# Patient Record
Sex: FEMALE | Race: ASIAN | NOT HISPANIC OR LATINO
[De-identification: names, ages, dates, MRNs, and addresses within clinical notes are randomized per-mention and may not be internally consistent; named-entity substitution may affect disease eponyms.]

---

## 2020-02-25 PROBLEM — Z00.00 ENCOUNTER FOR PREVENTIVE HEALTH EXAMINATION: Status: ACTIVE | Noted: 2020-02-25

## 2020-02-28 ENCOUNTER — APPOINTMENT (OUTPATIENT)
Dept: INTERNAL MEDICINE | Facility: CLINIC | Age: 30
End: 2020-02-28
Payer: COMMERCIAL

## 2020-02-28 VITALS
OXYGEN SATURATION: 100 % | BODY MASS INDEX: 28.18 KG/M2 | WEIGHT: 173.26 LBS | HEIGHT: 65.75 IN | HEART RATE: 94 BPM | TEMPERATURE: 98.1 F | DIASTOLIC BLOOD PRESSURE: 76 MMHG | SYSTOLIC BLOOD PRESSURE: 115 MMHG

## 2020-02-28 PROCEDURE — 99204 OFFICE O/P NEW MOD 45 MIN: CPT

## 2020-03-02 ENCOUNTER — APPOINTMENT (OUTPATIENT)
Dept: INTERNAL MEDICINE | Facility: CLINIC | Age: 30
End: 2020-03-02

## 2020-03-02 NOTE — HISTORY OF PRESENT ILLNESS
[FreeTextEntry8] : Patient presents to the office with a two week history of back pain travelling down right thigh. Started suddenly, has never had this before. Denies any weakness or numbness. Worse with standing and walking. Denies urinary retention or incontinence. Denies any drug use fevers or chills. Also has rectal pain, worse with bowel movements last month. Denies any abdominal pain, weight loss, family history of colon cancer. Stool is hard. Blood on wiping.

## 2020-03-02 NOTE — PHYSICAL EXAM
[Normal] : normal gait, coordination grossly intact, no focal deficits and deep tendon reflexes were 2+ and symmetric [FreeTextEntry1] : external hemorrhoid non thromobosed, fissure 12 o'clock position [de-identified] : tenderness over the sciatic notch, straight leg raise positive

## 2020-03-02 NOTE — ASSESSMENT
[FreeTextEntry1] : 1) External hemorrhoid/ anal fissure: non thrombosed advised steroid cream and nitroglycerin 0.2 percent. Call office of no improvement.\par 2) Sciatica: antiinflammatory given, mri if no improvement.

## 2020-03-09 RX ORDER — NITROGLYCERIN 20 MG/G
2 OINTMENT TOPICAL
Qty: 1 | Refills: 0 | Status: DISCONTINUED | COMMUNITY
Start: 2020-02-28 | End: 2020-03-09

## 2020-03-19 ENCOUNTER — APPOINTMENT (OUTPATIENT)
Dept: MRI IMAGING | Facility: CLINIC | Age: 30
End: 2020-03-19

## 2020-04-26 ENCOUNTER — MESSAGE (OUTPATIENT)
Age: 30
End: 2020-04-26

## 2020-09-04 ENCOUNTER — NON-APPOINTMENT (OUTPATIENT)
Age: 30
End: 2020-09-04

## 2020-09-04 ENCOUNTER — APPOINTMENT (OUTPATIENT)
Dept: INTERNAL MEDICINE | Facility: CLINIC | Age: 30
End: 2020-09-04
Payer: COMMERCIAL

## 2020-09-04 VITALS
OXYGEN SATURATION: 100 % | HEART RATE: 83 BPM | BODY MASS INDEX: 29.58 KG/M2 | DIASTOLIC BLOOD PRESSURE: 70 MMHG | WEIGHT: 177.57 LBS | HEIGHT: 64.96 IN | TEMPERATURE: 98.2 F | SYSTOLIC BLOOD PRESSURE: 110 MMHG

## 2020-09-04 DIAGNOSIS — K64.4 RESIDUAL HEMORRHOIDAL SKIN TAGS: ICD-10-CM

## 2020-09-04 PROCEDURE — 99213 OFFICE O/P EST LOW 20 MIN: CPT

## 2020-09-09 PROBLEM — K64.4 HEMORRHOIDS, EXTERNAL: Status: ACTIVE | Noted: 2020-02-28

## 2020-09-09 NOTE — PHYSICAL EXAM
[Normal] : soft, non-tender, non-distended, no masses palpated, no HSM and normal bowel sounds [FreeTextEntry1] : unable to find chaperone for evaluation

## 2020-09-09 NOTE — ASSESSMENT
[FreeTextEntry1] : Given persistent symptoms, referral placed to colorectal for further management, advised OTC preparation H, stool softeners.

## 2020-09-09 NOTE — HISTORY OF PRESENT ILLNESS
[FreeTextEntry1] : Patient presents to the office as she persistently has rectal discomfort, [de-identified] : Continues to have rectal pain and discomfort, denies any blood in the stool states the stool is soft. Denies any abdominal pain. Not currently taking any over-the-counter stool softeners. Attempted creams however had burning pain

## 2020-10-15 ENCOUNTER — APPOINTMENT (OUTPATIENT)
Dept: SURGERY | Facility: CLINIC | Age: 30
End: 2020-10-15
Payer: COMMERCIAL

## 2020-10-15 VITALS
BODY MASS INDEX: 29.16 KG/M2 | RESPIRATION RATE: 16 BRPM | WEIGHT: 175 LBS | SYSTOLIC BLOOD PRESSURE: 129 MMHG | OXYGEN SATURATION: 95 % | HEART RATE: 96 BPM | HEIGHT: 65 IN | DIASTOLIC BLOOD PRESSURE: 86 MMHG | TEMPERATURE: 97.8 F

## 2020-10-15 DIAGNOSIS — M54.31 SCIATICA, RIGHT SIDE: ICD-10-CM

## 2020-10-15 PROCEDURE — 99203 OFFICE O/P NEW LOW 30 MIN: CPT

## 2020-10-15 RX ORDER — ACETAMINOPHEN 325 MG
TABLET ORAL
Refills: 0 | Status: ACTIVE | COMMUNITY

## 2020-10-15 RX ORDER — HYDROCORTISONE 25 MG/G
2.5 CREAM TOPICAL
Qty: 1 | Refills: 2 | Status: DISCONTINUED | COMMUNITY
Start: 2020-02-28 | End: 2020-10-15

## 2020-10-15 RX ORDER — DICLOFENAC SODIUM 100 MG/1
100 TABLET, FILM COATED, EXTENDED RELEASE ORAL
Qty: 30 | Refills: 1 | Status: DISCONTINUED | COMMUNITY
Start: 2020-02-28 | End: 2020-10-15

## 2020-10-15 NOTE — HISTORY OF PRESENT ILLNESS
[FreeTextEntry1] : Mac is a 29 year old female with a hx of an anal fissure dx in March 2020 (Dr. Gray). Pt has sharp pain with BMs. Rarely has scant amount of BRBPR on TP. She was given NTG in March but d/c due to headaches. Tried Nifedipine, no relief. She also reports an external tag that is non-reducible.\par She has two normal formed BMs daily. Denies constipation. Never had a colonoscopy.

## 2020-10-15 NOTE — PHYSICAL EXAM
[Normal Breath Sounds] : Normal breath sounds [Normal Rate and Rhythm] : normal rate and rhythm [Normal Heart Sounds] : normal heart sounds [No Rash or Lesion] : No rash or lesion [No Edema] : No edema [Alert] : alert [Oriented to Place] : oriented to place [Oriented to Person] : oriented to person [Anxious] : anxious [Oriented to Time] : oriented to time [Abdomen Masses] : No abdominal masses [Abdomen Tenderness] : ~T No ~M abdominal tenderness [JVD] : no jugular venous distention  [Thyroid] : the thyroid was abnormal [Carotid Bruits] : no carotid bruits [Wheezing] : no wheezing was heard [de-identified] : Appears well nourished [de-identified] : WNL [FreeTextEntry1] : Perianal inspection demonstrated a large anterior tag and a small associated fissure.  Internal sphincter spasm noted on a limited digital exam.

## 2020-10-15 NOTE — ASSESSMENT
[FreeTextEntry1] : I have seen and evaluated patient and I have corroborated all nursing input into this note.  Patient with an anterior fissure and associated tag which has not responded to topical therapy.  Therefore, I reviewed the options of both Botox injections and sphincterotomy.  Either procedure would be combined with tag excision/fissurectomy.  Indications, risks, benefits, alternatives reviewed including but not limited to differences in success rate and risk of incontinence.  Patient chose Botox with tag excision/fissurectomy.  Arrangements will be made for the procedure.  If unsuccessful then surgery can be performed as needed

## 2020-10-15 NOTE — CONSULT LETTER
[Dear  ___] : Dear ~DEA, [Consult Letter:] : I had the pleasure of evaluating your patient, [unfilled]. [Consult Closing:] : Thank you very much for allowing me to participate in the care of this patient.  If you have any questions, please do not hesitate to contact me. [Please see my note below.] : Please see my note below. [FreeTextEntry2] : Dr. Georgi Gray [Sincerely,] : Sincerely, [FreeTextEntry3] : Paul Jhaveri M.D., XAVIER.CARY., F.AVEL.S.CHITRARLaurynS.\[facility] Chief Colorectal Clinical Services, [facility]

## 2020-11-05 ENCOUNTER — APPOINTMENT (OUTPATIENT)
Dept: SURGERY | Facility: CLINIC | Age: 30
End: 2020-11-05
Payer: COMMERCIAL

## 2020-11-05 VITALS
OXYGEN SATURATION: 97 % | HEART RATE: 88 BPM | DIASTOLIC BLOOD PRESSURE: 74 MMHG | TEMPERATURE: 97.5 F | RESPIRATION RATE: 15 BRPM | SYSTOLIC BLOOD PRESSURE: 117 MMHG

## 2020-11-05 PROCEDURE — 99072 ADDL SUPL MATRL&STAF TM PHE: CPT

## 2020-11-05 PROCEDURE — 46200 REMOVAL OF ANAL FISSURE: CPT

## 2020-11-05 PROCEDURE — 46505 CHEMODENERVATION ANAL MUSC: CPT

## 2020-11-05 NOTE — ASSESSMENT
[FreeTextEntry1] : Fissure and tag excision with Botox injection as above.  Sitz bath's.  Tylenol/ibuprofen.  Oxycodone for breakthrough.  MiraLAX as needed.  Follow-up 1 month.

## 2020-11-05 NOTE — PROCEDURE
[FreeTextEntry1] : Percent lidocaine with half percent Marcaine plus epinephrine injected.  Tag and base of fissure excised.  100 units of Botox total injected into internal sphincter.  50 units were injected on each side of the fissure.  Monsel solution applied.

## 2020-11-05 NOTE — HISTORY OF PRESENT ILLNESS
[FreeTextEntry1] : Mac is a 28 y/o female with a hx of an anal fissure dx in March 2020 (Dr. Gray).  She was given NTG in March but d/c due to headaches. Tried Nifedipine, no relief. Last seen on 10/15/20, patient with an anterior fissure and associated tag which has not responded to topical therapy. Patient is here today for Botox injection with tag excision/fissurectomy. \par \par Lot number: [id number]\par Exp: 06/ 2023

## 2020-11-09 LAB — CORE LAB BIOPSY: NORMAL

## 2020-12-07 ENCOUNTER — APPOINTMENT (OUTPATIENT)
Dept: OBGYN | Facility: CLINIC | Age: 30
End: 2020-12-07

## 2020-12-16 ENCOUNTER — APPOINTMENT (OUTPATIENT)
Dept: SURGERY | Facility: CLINIC | Age: 30
End: 2020-12-16
Payer: COMMERCIAL

## 2020-12-16 VITALS
SYSTOLIC BLOOD PRESSURE: 116 MMHG | HEART RATE: 93 BPM | DIASTOLIC BLOOD PRESSURE: 73 MMHG | TEMPERATURE: 98 F | OXYGEN SATURATION: 98 % | RESPIRATION RATE: 16 BRPM

## 2020-12-16 DIAGNOSIS — K60.2 ANAL FISSURE, UNSPECIFIED: ICD-10-CM

## 2020-12-16 DIAGNOSIS — Z09 ENCOUNTER FOR FOLLOW-UP EXAMINATION AFTER COMPLETED TREATMENT FOR CONDITIONS OTHER THAN MALIGNANT NEOPLASM: ICD-10-CM

## 2020-12-16 DIAGNOSIS — K59.4 ANAL SPASM: ICD-10-CM

## 2020-12-16 PROCEDURE — 99024 POSTOP FOLLOW-UP VISIT: CPT

## 2020-12-16 RX ORDER — ONABOTULINUMTOXINA 100 [USP'U]/1
100 INJECTION, POWDER, LYOPHILIZED, FOR SOLUTION INTRADERMAL; INTRAMUSCULAR
Qty: 1 | Refills: 0 | Status: DISCONTINUED | COMMUNITY
Start: 2020-10-20 | End: 2020-12-16

## 2020-12-16 RX ORDER — OXYCODONE 5 MG/1
5 TABLET ORAL
Qty: 6 | Refills: 0 | Status: DISCONTINUED | COMMUNITY
Start: 2020-11-05 | End: 2020-12-16

## 2020-12-16 RX ORDER — ONABOTULINUMTOXINA 100 [USP'U]/1
100 INJECTION, POWDER, LYOPHILIZED, FOR SOLUTION INTRADERMAL; INTRAMUSCULAR
Qty: 1 | Refills: 4 | Status: DISCONTINUED | COMMUNITY
Start: 2020-10-15 | End: 2020-12-16

## 2020-12-16 NOTE — HISTORY OF PRESENT ILLNESS
[FreeTextEntry1] : Mac is a 31 y/o female here following botox injections for her acute anal fissure with concurrent eag excision/fissurectomy on 11/05. Pt feels about 50% better. Still having some pain and scant amount of BRBPR with BMs. BMs occasionally hard, denies recent stool softener/laxative/fiber use.

## 2020-12-16 NOTE — ASSESSMENT
[FreeTextEntry1] : Patient 50% improved but still has hard bowel movements.  Not taking MiraLAX.  I strongly recommended daily MiraLAX and if patient improvement stalls then she will call my office for repeat Botox injections.

## 2021-02-05 ENCOUNTER — RESULT REVIEW (OUTPATIENT)
Age: 31
End: 2021-02-05

## 2021-02-05 ENCOUNTER — APPOINTMENT (OUTPATIENT)
Dept: OBGYN | Facility: CLINIC | Age: 31
End: 2021-02-05
Payer: COMMERCIAL

## 2021-02-05 PROCEDURE — 99072 ADDL SUPL MATRL&STAF TM PHE: CPT

## 2021-02-05 PROCEDURE — 99385 PREV VISIT NEW AGE 18-39: CPT

## 2021-02-08 ENCOUNTER — APPOINTMENT (OUTPATIENT)
Dept: OBGYN | Facility: CLINIC | Age: 31
End: 2021-02-08
Payer: COMMERCIAL

## 2021-02-08 PROCEDURE — 58300 INSERT INTRAUTERINE DEVICE: CPT

## 2021-02-08 PROCEDURE — 99072 ADDL SUPL MATRL&STAF TM PHE: CPT

## 2021-02-10 ENCOUNTER — APPOINTMENT (OUTPATIENT)
Dept: OBGYN | Facility: CLINIC | Age: 31
End: 2021-02-10

## 2022-11-30 ENCOUNTER — APPOINTMENT (OUTPATIENT)
Dept: OPHTHALMOLOGY | Facility: CLINIC | Age: 32
End: 2022-11-30

## 2022-11-30 ENCOUNTER — NON-APPOINTMENT (OUTPATIENT)
Age: 32
End: 2022-11-30

## 2022-11-30 PROCEDURE — 92004 COMPRE OPH EXAM NEW PT 1/>: CPT

## 2024-03-07 ENCOUNTER — APPOINTMENT (OUTPATIENT)
Dept: OBGYN | Facility: CLINIC | Age: 34
End: 2024-03-07